# Patient Record
Sex: FEMALE | Race: WHITE | NOT HISPANIC OR LATINO | URBAN - METROPOLITAN AREA
[De-identification: names, ages, dates, MRNs, and addresses within clinical notes are randomized per-mention and may not be internally consistent; named-entity substitution may affect disease eponyms.]

---

## 2022-02-12 ENCOUNTER — EMERGENCY (EMERGENCY)
Facility: HOSPITAL | Age: 62
LOS: 1 days | Discharge: HOME | End: 2022-02-12
Attending: EMERGENCY MEDICINE
Payer: COMMERCIAL

## 2022-02-12 VITALS
TEMPERATURE: 99 F | HEIGHT: 64 IN | SYSTOLIC BLOOD PRESSURE: 118 MMHG | RESPIRATION RATE: 18 BRPM | WEIGHT: 115.08 LBS | DIASTOLIC BLOOD PRESSURE: 79 MMHG | HEART RATE: 89 BPM

## 2022-02-12 LAB
ALBUMIN SERPL ELPH-MCNC: 4.5 G/DL — SIGNIFICANT CHANGE UP (ref 3.3–5)
ALP SERPL-CCNC: 62 U/L — SIGNIFICANT CHANGE UP (ref 40–120)
ALT FLD-CCNC: 19 U/L — SIGNIFICANT CHANGE UP (ref 10–45)
ANION GAP SERPL CALC-SCNC: 15 MMOL/L — SIGNIFICANT CHANGE UP (ref 5–17)
AST SERPL-CCNC: 32 U/L — SIGNIFICANT CHANGE UP (ref 10–40)
BASOPHILS # BLD AUTO: 0.02 K/UL — SIGNIFICANT CHANGE UP (ref 0–0.2)
BASOPHILS NFR BLD AUTO: 0.4 % — SIGNIFICANT CHANGE UP (ref 0–2)
BILIRUB SERPL-MCNC: 0.3 MG/DL — SIGNIFICANT CHANGE UP (ref 0.2–1.2)
BUN SERPL-MCNC: 9 MG/DL — SIGNIFICANT CHANGE UP (ref 7–23)
CALCIUM SERPL-MCNC: 9.6 MG/DL — SIGNIFICANT CHANGE UP (ref 8.4–10.5)
CHLORIDE SERPL-SCNC: 102 MMOL/L — SIGNIFICANT CHANGE UP (ref 96–108)
CO2 SERPL-SCNC: 23 MMOL/L — SIGNIFICANT CHANGE UP (ref 22–31)
CREAT SERPL-MCNC: 0.7 MG/DL — SIGNIFICANT CHANGE UP (ref 0.5–1.3)
D DIMER BLD IA.RAPID-MCNC: <150 NG/ML DDU — SIGNIFICANT CHANGE UP
EOSINOPHIL # BLD AUTO: 0.03 K/UL — SIGNIFICANT CHANGE UP (ref 0–0.5)
EOSINOPHIL NFR BLD AUTO: 0.5 % — SIGNIFICANT CHANGE UP (ref 0–6)
FLUAV AG NPH QL: SIGNIFICANT CHANGE UP
FLUBV AG NPH QL: SIGNIFICANT CHANGE UP
GLUCOSE SERPL-MCNC: 101 MG/DL — HIGH (ref 70–99)
HCT VFR BLD CALC: 38.7 % — SIGNIFICANT CHANGE UP (ref 34.5–45)
HGB BLD-MCNC: 13.5 G/DL — SIGNIFICANT CHANGE UP (ref 11.5–15.5)
IMM GRANULOCYTES NFR BLD AUTO: 0.2 % — SIGNIFICANT CHANGE UP (ref 0–1.5)
LYMPHOCYTES # BLD AUTO: 1.85 K/UL — SIGNIFICANT CHANGE UP (ref 1–3.3)
LYMPHOCYTES # BLD AUTO: 32.6 % — SIGNIFICANT CHANGE UP (ref 13–44)
MAGNESIUM SERPL-MCNC: 1.8 MG/DL — SIGNIFICANT CHANGE UP (ref 1.6–2.6)
MCHC RBC-ENTMCNC: 31.2 PG — SIGNIFICANT CHANGE UP (ref 27–34)
MCHC RBC-ENTMCNC: 34.9 GM/DL — SIGNIFICANT CHANGE UP (ref 32–36)
MCV RBC AUTO: 89.4 FL — SIGNIFICANT CHANGE UP (ref 80–100)
MONOCYTES # BLD AUTO: 0.52 K/UL — SIGNIFICANT CHANGE UP (ref 0–0.9)
MONOCYTES NFR BLD AUTO: 9.2 % — SIGNIFICANT CHANGE UP (ref 2–14)
NEUTROPHILS # BLD AUTO: 3.25 K/UL — SIGNIFICANT CHANGE UP (ref 1.8–7.4)
NEUTROPHILS NFR BLD AUTO: 57.1 % — SIGNIFICANT CHANGE UP (ref 43–77)
NRBC # BLD: 0 /100 WBCS — SIGNIFICANT CHANGE UP (ref 0–0)
NT-PROBNP SERPL-SCNC: 132 PG/ML — SIGNIFICANT CHANGE UP (ref 0–300)
PLATELET # BLD AUTO: 279 K/UL — SIGNIFICANT CHANGE UP (ref 150–400)
POTASSIUM SERPL-MCNC: 2.9 MMOL/L — CRITICAL LOW (ref 3.5–5.3)
POTASSIUM SERPL-SCNC: 2.9 MMOL/L — CRITICAL LOW (ref 3.5–5.3)
PROT SERPL-MCNC: 7 G/DL — SIGNIFICANT CHANGE UP (ref 6–8.3)
RBC # BLD: 4.33 M/UL — SIGNIFICANT CHANGE UP (ref 3.8–5.2)
RBC # FLD: 12.1 % — SIGNIFICANT CHANGE UP (ref 10.3–14.5)
RSV RNA NPH QL NAA+NON-PROBE: SIGNIFICANT CHANGE UP
SARS-COV-2 RNA SPEC QL NAA+PROBE: SIGNIFICANT CHANGE UP
SODIUM SERPL-SCNC: 140 MMOL/L — SIGNIFICANT CHANGE UP (ref 135–145)
TROPONIN T, HIGH SENSITIVITY RESULT: 8 NG/L — SIGNIFICANT CHANGE UP (ref 0–51)
WBC # BLD: 5.68 K/UL — SIGNIFICANT CHANGE UP (ref 3.8–10.5)
WBC # FLD AUTO: 5.68 K/UL — SIGNIFICANT CHANGE UP (ref 3.8–10.5)

## 2022-02-12 PROCEDURE — 71045 X-RAY EXAM CHEST 1 VIEW: CPT | Mod: 26

## 2022-02-12 PROCEDURE — 99285 EMERGENCY DEPT VISIT HI MDM: CPT

## 2022-02-12 PROCEDURE — 93010 ELECTROCARDIOGRAM REPORT: CPT

## 2022-02-12 RX ORDER — SODIUM CHLORIDE 9 MG/ML
1000 INJECTION, SOLUTION INTRAVENOUS ONCE
Refills: 0 | Status: COMPLETED | OUTPATIENT
Start: 2022-02-12 | End: 2022-02-12

## 2022-02-12 RX ORDER — POTASSIUM CHLORIDE 20 MEQ
40 PACKET (EA) ORAL ONCE
Refills: 0 | Status: COMPLETED | OUTPATIENT
Start: 2022-02-12 | End: 2022-02-12

## 2022-02-12 RX ORDER — POTASSIUM CHLORIDE 20 MEQ
40 PACKET (EA) ORAL ONCE
Refills: 0 | Status: DISCONTINUED | OUTPATIENT
Start: 2022-02-12 | End: 2022-02-12

## 2022-02-12 RX ORDER — POTASSIUM CHLORIDE 20 MEQ
10 PACKET (EA) ORAL
Refills: 0 | Status: COMPLETED | OUTPATIENT
Start: 2022-02-12 | End: 2022-02-12

## 2022-02-12 RX ADMIN — Medication 40 MILLIEQUIVALENT(S): at 21:02

## 2022-02-12 RX ADMIN — Medication 100 MILLIEQUIVALENT(S): at 21:04

## 2022-02-12 RX ADMIN — Medication 100 MILLIEQUIVALENT(S): at 22:30

## 2022-02-12 RX ADMIN — SODIUM CHLORIDE 1000 MILLILITER(S): 9 INJECTION, SOLUTION INTRAVENOUS at 18:22

## 2022-02-12 RX ADMIN — Medication 100 MILLIEQUIVALENT(S): at 23:22

## 2022-02-12 NOTE — ED PROVIDER NOTE - NSFOLLOWUPCLINICS_GEN_ALL_ED_FT
[facility]  Cardiology  [address] [place] [id number]  Phone: [phone]  Fax:

## 2022-02-12 NOTE — ED PROVIDER NOTE - OBJECTIVE STATEMENT
62 yo F hx of basal cell carcinoma presents with palpitations past few days, Had a viral GI illness in the last week with diarrhea, fevers and began having exertional palpitations in last 3-4 days when she walks, accompanied by some shortness of breath. Endorses pain under left axilla when she raises her arms up. Went to urgent care yesterday where she had normal labs, got 1L IVF and had ekg significant for ectopic atrial focus so sent to the ER. No CP, leg swelling, abd pain, current diarrhea.

## 2022-02-12 NOTE — ED ADULT NURSE REASSESSMENT NOTE - NS ED NURSE REASSESS COMMENT FT1
Handoff report received from RN Pat. Pt resting comfortably in purple hern[address] 10. Pt A&O x 4, VSS. Pt denies palpitations, CP and SOB. Pt denies any needs at this time. Pt updated on plan of care. Bed in lowest position, side rails up and call bell in reach.

## 2022-02-12 NOTE — ED PROVIDER NOTE - NSFOLLOWUPINSTRUCTIONS_ED_ALL_ED_FT
Follow-up with your primary care physician if your symptoms persist or get worse.    I have attached followup with a cardiologist. Please call to schedule an appointment.    Palpitations    A palpitation is the feeling that your heartbeat is irregular or is faster than normal. It may feel like your heart is fluttering or skipping a beat. They may be caused by many things, including smoking, caffeine, alcohol, stress, and certain medicines. Although most causes of palpitations are not serious, palpitations can be a sign of a serious medical problem. Avoid caffeine, alcohol, and tobacco products at home. Try to reduce stress and anxiety and make sure to get plenty of rest.     SEEK IMMEDIATE MEDICAL CARE IF YOU HAVE ANY OF THE FOLLOWING SYMPTOMS: chest pain, shortness of breath, severe headache, dizziness/lightheadedness, or fainting.

## 2022-02-12 NOTE — ED PROVIDER NOTE - ATTENDING CONTRIBUTION TO CARE
RGUJRAL 60yo f hx BCC presents with palpitations x 3 days. Patient states she has had enteritis since last Monday with multiple episodes of diarrhea. Pt went to  yesterday had IVF given and felt better and tolerated a meal last night with resolving diarrhea. Denies any abdominal pain, n/v/f/c, sick contacts, recent travel. Vaccinated for COVID. Pt has noted intermittent palpitations and today noted  while doing ADLs. Pt complains of mild SOB w exertion. No chest pain.   On exam, Patient is awake, alert and oriented x 3.  Patient is well appearing and in no acute distress.  NCAT  Neck is supple  Lungs are CTA B/L,+S1S2 no murmurs,  Abdomen:Soft nd/nt+bs no rebound or guarding.  Extremity no edema or calf tender.  Skin with no rash.  Neuro CN3-12 intact. Strength 5/5 in upper and lower extremities. Nml Sensation.  EKG reviewed. Check labs, IVF and re eval.

## 2022-02-12 NOTE — ED PROVIDER NOTE - PATIENT PORTAL LINK FT
You can access the FollowMyHealth Patient Portal offered by [facility] by registering at the following website: [email or web address]. By joining [employer]’s FollowMyHealth portal, you will also be able to view your health information using other applications (apps) compatible with our system.

## 2022-02-12 NOTE — ED PROVIDER NOTE - CLINICAL SUMMARY MEDICAL DECISION MAKING FREE TEXT BOX
60 yo F being treated for basal cell carcinoma presents with palpitations for 3-4 days after a viral GI illness, focal exam negative with stable vitals. Will get ekg/trops to assess for acs, arrythmias, labs for electrolyte abnormalities in the setting of diarrhea losses. Will d-dimer to rule out PE as patient is being treated for malignancy. Will give fluids and reassess.

## 2022-02-12 NOTE — ED PROVIDER NOTE - PROGRESS NOTE DETAILS
Venkata Hopkins MD (PGY-2): Received sign-out from day-team resident. Patient reassessed before sign out - improved after 1 L IVF, no palpitations walking to bathroom.

## 2022-02-12 NOTE — ED PROVIDER NOTE - NS ED ROS FT
GENERAL: no fever, no chills, no weight lossn  CARDIAC: +palpitations, no chest pain  PULMONARY: no cough, no shortness of breath, no wheezing  GI: no abdominal pain, no nausea, no vomiting, no diarrhea, no constipation, no melena, no hematochezia, no hematemesis  : no changes in urination, no dysuria, no frequency, no hematuria, no discharge  SKIN: no rashes  NEURO: no headache, no numbness, no weakness  MSK: no joint pain, no muscle pain, no back pain, no calf pain

## 2022-02-12 NOTE — ED ADULT NURSE NOTE - OBJECTIVE STATEMENT
Pt reports having "stomach virus" with "dry Heaves" and watery diarrhea, starting earlier this week, for which she was seen yesterday @ [facility] and given IV hydration, felt better, and was discharged home.  She was able to eat something then.  Overnight she reports 1 extreme diaphoresis and intermittent fevers over past few days.  Today, she was doing household chores, felt her heart racing, checked her pulse with a pulse oximeter and found it to be in 150's.  She returned to [facility] and was directed to ED for eval of an abnormal EKG.  Oral mucosa dry, with brown discoloration.  No sternal area chest pain, but noted pain along left lateral chest when she raised her arm up.

## 2022-02-12 NOTE — ED PROVIDER NOTE - PHYSICAL EXAMINATION
GENERAL: no acute distress, non-toxic appearing  HEAD: normocephalic, atraumatic  HEENT: normal conjunctiva, oral mucosa moist, neck supple  CARDIAC: regular rate and rhythm, normal S1 and S2,  no appreciable murmurs  PULM: clear to ascultation bilaterally, no crackles, rales, rhonchi, or wheezing  GI: abdomen nondistended, soft, nontender, no guarding or rebound tenderness  NEURO: alert and oriented x 3, moving all extremities  MSK: no visible deformities, no peripheral edema, calf tenderness/redness/swelling  SKIN: no visible rashes, dry, well-perfused  PSYCH: appropriate mood and affect

## 2022-02-13 ENCOUNTER — FORM ENCOUNTER (OUTPATIENT)
Age: 62
End: 2022-02-13

## 2022-02-13 VITALS
DIASTOLIC BLOOD PRESSURE: 71 MMHG | HEART RATE: 74 BPM | RESPIRATION RATE: 16 BRPM | SYSTOLIC BLOOD PRESSURE: 115 MMHG | TEMPERATURE: 98 F | OXYGEN SATURATION: 97 %

## 2022-02-13 LAB
ANION GAP SERPL CALC-SCNC: 11 MMOL/L — SIGNIFICANT CHANGE UP (ref 5–17)
BUN SERPL-MCNC: 8 MG/DL — SIGNIFICANT CHANGE UP (ref 7–23)
CALCIUM SERPL-MCNC: 8.8 MG/DL — SIGNIFICANT CHANGE UP (ref 8.4–10.5)
CHLORIDE SERPL-SCNC: 107 MMOL/L — SIGNIFICANT CHANGE UP (ref 96–108)
CO2 SERPL-SCNC: 25 MMOL/L — SIGNIFICANT CHANGE UP (ref 22–31)
CREAT SERPL-MCNC: 0.79 MG/DL — SIGNIFICANT CHANGE UP (ref 0.5–1.3)
GLUCOSE SERPL-MCNC: 115 MG/DL — HIGH (ref 70–99)
POTASSIUM SERPL-MCNC: 3.5 MMOL/L — SIGNIFICANT CHANGE UP (ref 3.5–5.3)
POTASSIUM SERPL-SCNC: 3.5 MMOL/L — SIGNIFICANT CHANGE UP (ref 3.5–5.3)
SODIUM SERPL-SCNC: 143 MMOL/L — SIGNIFICANT CHANGE UP (ref 135–145)

## 2022-02-13 PROCEDURE — 96374 THER/PROPH/DIAG INJ IV PUSH: CPT

## 2022-02-13 PROCEDURE — 84100 ASSAY OF PHOSPHORUS: CPT

## 2022-02-13 PROCEDURE — 83735 ASSAY OF MAGNESIUM: CPT

## 2022-02-13 PROCEDURE — 87637 SARSCOV2&INF A&B&RSV AMP PRB: CPT

## 2022-02-13 PROCEDURE — 85025 COMPLETE CBC W/AUTO DIFF WBC: CPT

## 2022-02-13 PROCEDURE — 96375 TX/PRO/DX INJ NEW DRUG ADDON: CPT

## 2022-02-13 PROCEDURE — 84484 ASSAY OF TROPONIN QUANT: CPT

## 2022-02-13 PROCEDURE — 36415 COLL VENOUS BLD VENIPUNCTURE: CPT

## 2022-02-13 PROCEDURE — 83880 ASSAY OF NATRIURETIC PEPTIDE: CPT

## 2022-02-13 PROCEDURE — 71045 X-RAY EXAM CHEST 1 VIEW: CPT

## 2022-02-13 PROCEDURE — 93005 ELECTROCARDIOGRAM TRACING: CPT

## 2022-02-13 PROCEDURE — 80053 COMPREHEN METABOLIC PANEL: CPT

## 2022-02-13 PROCEDURE — 80048 BASIC METABOLIC PNL TOTAL CA: CPT

## 2022-02-13 PROCEDURE — 85379 FIBRIN DEGRADATION QUANT: CPT

## 2022-02-13 PROCEDURE — 99285 EMERGENCY DEPT VISIT HI MDM: CPT | Mod: 25

## 2022-02-14 ENCOUNTER — APPOINTMENT (OUTPATIENT)
Dept: CARDIOLOGY | Facility: CLINIC | Age: 62
End: 2022-02-14
Payer: MEDICARE

## 2022-02-14 ENCOUNTER — APPOINTMENT (OUTPATIENT)
Dept: ELECTROPHYSIOLOGY | Facility: CLINIC | Age: 62
End: 2022-02-14
Payer: COMMERCIAL

## 2022-02-14 VITALS
HEART RATE: 77 BPM | WEIGHT: 115 LBS | OXYGEN SATURATION: 97 % | HEIGHT: 64 IN | SYSTOLIC BLOOD PRESSURE: 124 MMHG | BODY MASS INDEX: 19.63 KG/M2 | DIASTOLIC BLOOD PRESSURE: 73 MMHG

## 2022-02-14 DIAGNOSIS — E87.6 HYPOKALEMIA: ICD-10-CM

## 2022-02-14 DIAGNOSIS — R11.0 NAUSEA: ICD-10-CM

## 2022-02-14 DIAGNOSIS — E86.0 DEHYDRATION: ICD-10-CM

## 2022-02-14 DIAGNOSIS — R11.10 VOMITING, UNSPECIFIED: ICD-10-CM

## 2022-02-14 DIAGNOSIS — U07.1 COVID-19: ICD-10-CM

## 2022-02-14 DIAGNOSIS — R42 DIZZINESS AND GIDDINESS: ICD-10-CM

## 2022-02-14 DIAGNOSIS — R19.7 DIARRHEA, UNSPECIFIED: ICD-10-CM

## 2022-02-14 DIAGNOSIS — Z72.89 OTHER PROBLEMS RELATED TO LIFESTYLE: ICD-10-CM

## 2022-02-14 PROBLEM — Z78.9 OTHER SPECIFIED HEALTH STATUS: Chronic | Status: ACTIVE | Noted: 2022-02-12

## 2022-02-14 PROCEDURE — 99204 OFFICE O/P NEW MOD 45 MIN: CPT

## 2022-02-14 PROCEDURE — 93000 ELECTROCARDIOGRAM COMPLETE: CPT

## 2022-02-14 RX ORDER — COLLAGEN, HYDROLYSATE (BOVINE) 100 %
POWDER (GRAM) MISCELLANEOUS
Refills: 0 | Status: ACTIVE | COMMUNITY

## 2022-02-14 NOTE — HISTORY OF PRESENT ILLNESS
[FreeTextEntry1] : Patient is a 61 year old without past medical hx who self refers for palpitations.   She had a bad GI virus last week\par ran fever briefly with severe diarrhea.  Lasted for 5 days.  Went to urgent care on the 5th day for palpitations and from there was referred to the ED for hydration.  Her potassium was 2.9 on 2/12 and then 3.5 on 2/13.\par blood work was completely normal except for the low potassium.   ECG and vitals reported normal range heart rates.\par today felt nausea and felt her heart to be racing and came for evaluation.  she has been having diarrhea until today. hasn’t taken anything to try to stop diarrhea.  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

## 2022-02-14 NOTE — CARDIOLOGY SUMMARY
[de-identified] : Sinus  Rhythm  -Short LA syndrome \par Roxanne = 96\par -Nonspecific ST depression  -Nondiagnostic. \par \par ABNORMAL \par

## 2022-02-14 NOTE — DISCUSSION/SUMMARY
[None] : There are no changes in medication management [___ Week(s)] : in [unfilled] week(s) [de-identified] : dehydration [FreeTextEntry1] : Viral syndrome with hypokalemia. dehydration, low potassium and feelings of severe palpitations\par most likely due to dehydration.  needs to rehydrate, Pedialyte\par echo and heart event monitor

## 2022-03-02 ENCOUNTER — APPOINTMENT (OUTPATIENT)
Dept: CARDIOLOGY | Facility: CLINIC | Age: 62
End: 2022-03-02
Payer: COMMERCIAL

## 2022-03-02 PROCEDURE — 93306 TTE W/DOPPLER COMPLETE: CPT

## 2022-03-07 ENCOUNTER — NON-APPOINTMENT (OUTPATIENT)
Age: 62
End: 2022-03-07

## 2022-03-07 ENCOUNTER — APPOINTMENT (OUTPATIENT)
Dept: CARDIOLOGY | Facility: CLINIC | Age: 62
End: 2022-03-07
Payer: COMMERCIAL

## 2022-03-07 VITALS — SYSTOLIC BLOOD PRESSURE: 115 MMHG | OXYGEN SATURATION: 98 % | HEART RATE: 65 BPM | DIASTOLIC BLOOD PRESSURE: 73 MMHG

## 2022-03-07 VITALS — HEIGHT: 64 IN | WEIGHT: 119 LBS | BODY MASS INDEX: 20.32 KG/M2

## 2022-03-07 PROCEDURE — 93000 ELECTROCARDIOGRAM COMPLETE: CPT

## 2022-03-07 PROCEDURE — 99214 OFFICE O/P EST MOD 30 MIN: CPT

## 2022-03-07 NOTE — HISTORY OF PRESENT ILLNESS
[FreeTextEntry1] : Patient is a 61 year old without past medical hx who self refers for palpitations.   She had a bad GI virus last week\par ran fever briefly with severe diarrhea.  Lasted for 5 days.  Went to urgent care on the 5th day for palpitations and from there was referred to the ED for hydration.  Her potassium was 2.9 on 2/12 and then 3.5 on 2/13.\par blood work was completely normal except for the low potassium.   ECG and vitals reported normal range heart rates.\par today felt nausea and felt her heart to be racing and came for evaluation.  she has been having diarrhea until today. hasn’t taken anything to try to stop diarrhea.  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  all GI sx are resolved.  \par echo; structurally normal heart with mild mr/tr\par event recorder: results not back yet\par

## 2022-03-07 NOTE — DISCUSSION/SUMMARY
[Anxiety] : anxiety disorder NOS [Paroxysmal SVT] : paroxysmal supraventricular tachycardia [None] : There are no changes in medication management [___ Month(s)] : in [unfilled] month(s) [de-identified] : dehydration [FreeTextEntry1] : work up shows normal heart structurally.  GI sx have resolved. event recorder with brief self terminating SVT events which correlated wit pts sx most likely triiggered by GI sx.\par sx have now resolved.  \par would ask pt to cont to monitor for recurrence.\par get blood work from PCP.\par f/u 3 months

## 2022-03-08 PROCEDURE — 93248 EXT ECG>7D<15D REV&INTERPJ: CPT

## 2022-03-23 ENCOUNTER — APPOINTMENT (OUTPATIENT)
Dept: GASTROENTEROLOGY | Facility: CLINIC | Age: 62
End: 2022-03-23
Payer: COMMERCIAL

## 2022-03-23 VITALS
OXYGEN SATURATION: 98 % | HEART RATE: 71 BPM | BODY MASS INDEX: 20.57 KG/M2 | WEIGHT: 120.5 LBS | SYSTOLIC BLOOD PRESSURE: 110 MMHG | TEMPERATURE: 96.9 F | DIASTOLIC BLOOD PRESSURE: 72 MMHG | HEIGHT: 64 IN

## 2022-03-23 DIAGNOSIS — Z11.52 ENCOUNTER FOR SCREENING FOR COVID-19: ICD-10-CM

## 2022-03-23 DIAGNOSIS — R19.5 OTHER FECAL ABNORMALITIES: ICD-10-CM

## 2022-03-23 DIAGNOSIS — Z01.818 ENCOUNTER FOR OTHER PREPROCEDURAL EXAMINATION: ICD-10-CM

## 2022-03-23 DIAGNOSIS — Z00.00 ENCOUNTER FOR GENERAL ADULT MEDICAL EXAMINATION W/OUT ABNORMAL FINDINGS: ICD-10-CM

## 2022-03-23 PROCEDURE — 99203 OFFICE O/P NEW LOW 30 MIN: CPT

## 2022-03-23 NOTE — ASSESSMENT
[FreeTextEntry1] : Impression and plan\par The patient came to the office today to discuss recent positive colguard  she has a previous history of adenomatous polyps. I will recommend upper endoscopy and colonoscopy to be performed in tandem. The risks benefits alternatives and limitations of procedure were discussed. We'll make further suggestions after above testing is completed and advise.

## 2022-03-23 NOTE — HISTORY OF PRESENT ILLNESS
[FreeTextEntry1] : Patient presents to the office today for evaluation of recent Cologuard testing. Patient went for a physical with her primary care physician and was noted to have a positive cologuard test.  Patient's past medical history was reviewed and includes a recent bout of gastroenteritis about 6 weeks ago which has since resolved. Patient has had previous colonoscopy about 10 years ago demonstrating sessile serrated adenoma of the cecum. The patient denies current nausea vomiting fever chills rectal bleeding or melena.

## 2022-07-11 ENCOUNTER — NON-APPOINTMENT (OUTPATIENT)
Age: 62
End: 2022-07-11

## 2022-07-11 ENCOUNTER — APPOINTMENT (OUTPATIENT)
Dept: CARDIOLOGY | Facility: CLINIC | Age: 62
End: 2022-07-11

## 2022-07-11 VITALS
OXYGEN SATURATION: 98 % | BODY MASS INDEX: 20.49 KG/M2 | SYSTOLIC BLOOD PRESSURE: 137 MMHG | WEIGHT: 120 LBS | DIASTOLIC BLOOD PRESSURE: 78 MMHG | HEART RATE: 71 BPM | HEIGHT: 64 IN

## 2022-07-11 PROCEDURE — 99214 OFFICE O/P EST MOD 30 MIN: CPT

## 2022-07-11 PROCEDURE — 93000 ELECTROCARDIOGRAM COMPLETE: CPT

## 2022-07-11 NOTE — DISCUSSION/SUMMARY
[Paroxysmal Atrial Tachycardia] : paroxysmal atrial tachycardia [Stable] : stable [___ Month(s)] : in [unfilled] month(s) [FreeTextEntry1] : work up shows normal heart structurally.  GI sx have resolved. event recorder with brief self terminating SVT events which correlated wit pts sx most likely triiggered by GI sx.\par sx have now resolved.  \par would ask pt to cont to monitor for recurrence.\par get blood work from PCP.\par f/u 3 months\par \par 7/11/2022  brief runs of self terminating SVT which are improving overall.  discussed trial of beta blocker vs observation.  sx are improving overall.  \par recheck cholesterol, TFTS and metab panel

## 2022-07-11 NOTE — HISTORY OF PRESENT ILLNESS
[FreeTextEntry1] : Patient is a 61 year old without past medical hx who self refers for palpitations.   She had a bad GI virus last week\par ran fever briefly with severe diarrhea.  Lasted for 5 days.  Went to urgent care on the 5th day for palpitations and from there was referred to the ED for hydration.  Her potassium was 2.9 on 2/12 and then 3.5 on 2/13.\par blood work was completely normal except for the low potassium.   ECG and vitals reported normal range heart rates.\par today felt nausea and felt her heart to be racing and came for evaluation.  she has been having diarrhea until today. hasn’t taken anything to try to stop diarrhea.  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  all GI sx are resolved.  \par echo; structurally normal heart with mild mr/tr\par event recorder: results not back yet\par \par \par 7/11/2022  overall feels better.  structurally normal heart.  brief runs of SVT all self terminate and appear to be fewer in event over the last month.

## 2022-07-15 ENCOUNTER — LABORATORY RESULT (OUTPATIENT)
Age: 62
End: 2022-07-15

## 2022-07-20 ENCOUNTER — APPOINTMENT (OUTPATIENT)
Dept: GASTROENTEROLOGY | Facility: AMBULATORY MEDICAL SERVICES | Age: 62
End: 2022-07-20

## 2022-07-20 PROCEDURE — 45380 COLONOSCOPY AND BIOPSY: CPT

## 2022-07-20 PROCEDURE — 43235 EGD DIAGNOSTIC BRUSH WASH: CPT | Mod: 59

## 2022-08-10 ENCOUNTER — NON-APPOINTMENT (OUTPATIENT)
Age: 62
End: 2022-08-10

## 2022-08-10 RX ORDER — BISMUTH SUBSALICYLATE 262 MG
262 TABLET,CHEWABLE ORAL 4 TIMES DAILY
Qty: 120 | Refills: 0 | Status: ACTIVE | COMMUNITY
Start: 2022-08-10 | End: 1900-01-01

## 2022-09-19 LAB
ALBUMIN SERPL ELPH-MCNC: 4.5 G/DL
ALP BLD-CCNC: 80 U/L
ALT SERPL-CCNC: 21 U/L
ANION GAP SERPL CALC-SCNC: 10 MMOL/L
AST SERPL-CCNC: 21 U/L
BILIRUB SERPL-MCNC: 0.4 MG/DL
BUN SERPL-MCNC: 20 MG/DL
CALCIUM SERPL-MCNC: 9.6 MG/DL
CHLORIDE SERPL-SCNC: 104 MMOL/L
CO2 SERPL-SCNC: 28 MMOL/L
CREAT SERPL-MCNC: 0.8 MG/DL
EGFR: 84 ML/MIN/1.73M2
GLUCOSE SERPL-MCNC: 94 MG/DL
POTASSIUM SERPL-SCNC: 4.4 MMOL/L
PROT SERPL-MCNC: 6.8 G/DL
SODIUM SERPL-SCNC: 142 MMOL/L

## 2022-09-20 LAB
CHOLEST SERPL-MCNC: 258 MG/DL
HDLC SERPL-MCNC: 68 MG/DL
LDLC SERPL CALC-MCNC: 173 MG/DL
NONHDLC SERPL-MCNC: 190 MG/DL
T4 SERPL-MCNC: 6.9 UG/DL
TRIGL SERPL-MCNC: 88 MG/DL
TSH SERPL-ACNC: 0.78 UIU/ML
UREA BREATH TEST QL: NEGATIVE

## 2022-09-28 ENCOUNTER — APPOINTMENT (OUTPATIENT)
Dept: GASTROENTEROLOGY | Facility: CLINIC | Age: 62
End: 2022-09-28

## 2022-09-28 VITALS
HEIGHT: 64 IN | WEIGHT: 117 LBS | HEART RATE: 68 BPM | SYSTOLIC BLOOD PRESSURE: 120 MMHG | BODY MASS INDEX: 19.97 KG/M2 | OXYGEN SATURATION: 99 % | DIASTOLIC BLOOD PRESSURE: 68 MMHG | TEMPERATURE: 97.7 F

## 2022-09-28 DIAGNOSIS — A04.8 OTHER SPECIFIED BACTERIAL INTESTINAL INFECTIONS: ICD-10-CM

## 2022-09-28 PROCEDURE — 99213 OFFICE O/P EST LOW 20 MIN: CPT

## 2022-09-28 NOTE — ASSESSMENT
[FreeTextEntry1] : Impression and plan\par \par Patient came to the office today for follow-up and discussion original presentation was for positive Cologuard test.  Subsequent colonoscopy was performed demonstrating a small benign polyp in the rectum, hyperplasia.  Patient also noted to have erythematous mucosa possibly related to preparation.  Biopsies were unrevealing.  Upper endoscopy showed H. pylori infection and subsequent H. pylori breath testing was negative.  Patient feels well and offers no complaints at this time.  I have asked the patient to come back and see me in about a year for routine follow-up sooner if necessary for any symptoms.  I suggested repeat Cologuard test in approximately 6 months.  Patient deferred secondary to cost.

## 2022-09-28 NOTE — HISTORY OF PRESENT ILLNESS
[FreeTextEntry1] : Patient returns to the office today for follow-up and discussion after recent endoscopy and colonoscopy.  She was found to have H. pylori infection and was treated with amoxicillin based regimen.  Subsequent H. pylori breath testing was negative.  Patient feels well and offers no complaints.

## 2022-10-14 ENCOUNTER — APPOINTMENT (OUTPATIENT)
Dept: CARDIOLOGY | Facility: CLINIC | Age: 62
End: 2022-10-14

## 2022-10-14 ENCOUNTER — NON-APPOINTMENT (OUTPATIENT)
Age: 62
End: 2022-10-14

## 2022-10-14 VITALS
WEIGHT: 117 LBS | DIASTOLIC BLOOD PRESSURE: 74 MMHG | HEART RATE: 64 BPM | OXYGEN SATURATION: 98 % | BODY MASS INDEX: 20.47 KG/M2 | HEIGHT: 63.5 IN | SYSTOLIC BLOOD PRESSURE: 122 MMHG

## 2022-10-14 PROCEDURE — 93000 ELECTROCARDIOGRAM COMPLETE: CPT

## 2022-10-14 PROCEDURE — 99214 OFFICE O/P EST MOD 30 MIN: CPT | Mod: 25

## 2022-10-14 NOTE — DISCUSSION/SUMMARY
[Hyperlipidemia] : hyperlipidemia [Diet Modification] : diet modification [Paroxysmal A-V Tachycardia] : paroxysmal A-V tachycardia [Improving] : improving [___ Month(s)] : in [unfilled] month(s) [FreeTextEntry1] : work up shows normal heart structurally.  GI sx have resolved. event recorder with brief self terminating SVT events which correlated wit pts sx most likely triiggered by GI sx.\par sx have now resolved.  \par would ask pt to cont to monitor for recurrence.\par get blood work from PCP.\par f/u 3 months\par \par 10/14/2022 BP is good, palps are much better, cholesterol has gone up. and diet needs modification.  nutrition referral.  repeat blood work in 2 months. screening carotid doppler.

## 2022-10-14 NOTE — HISTORY OF PRESENT ILLNESS
[FreeTextEntry1] : Patient is a 61 year old without past medical hx who self refers for palpitations.   She had a bad GI virus last week\par ran fever briefly with severe diarrhea.  Lasted for 5 days.  Went to urgent care on the 5th day for palpitations and from there was referred to the ED for hydration.  Her potassium was 2.9 on 2/12 and then 3.5 on 2/13.\par blood work was completely normal except for the low potassium.   ECG and vitals reported normal range heart rates.\par today felt nausea and felt her heart to be racing and came for evaluation.  she has been having diarrhea until today. hasn’t taken anything to try to stop diarrhea.  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  all GI sx are resolved.  \par echo; structurally normal heart with mild mr/tr\par event recorder: results not back yet\par \par \par 7/11/2022  overall feels better.  structurally normal heart.  brief runs of SVT all self terminate and appear to be fewer in event over the last month.  \par \par 10/14/2022  discussed significant increase in cholesterol.  pt states she eats fried chicken twice per week.  has never seen nutritonist.  will make referral

## 2022-10-26 ENCOUNTER — APPOINTMENT (OUTPATIENT)
Dept: ULTRASOUND IMAGING | Facility: CLINIC | Age: 62
End: 2022-10-26

## 2022-10-26 PROCEDURE — 93880 EXTRACRANIAL BILAT STUDY: CPT

## 2022-12-29 LAB
CHOLEST SERPL-MCNC: 249 MG/DL
HDLC SERPL-MCNC: 69 MG/DL
LDLC SERPL CALC-MCNC: 147 MG/DL
NONHDLC SERPL-MCNC: 180 MG/DL
TRIGL SERPL-MCNC: 163 MG/DL

## 2023-01-20 ENCOUNTER — APPOINTMENT (OUTPATIENT)
Dept: CARDIOLOGY | Facility: CLINIC | Age: 63
End: 2023-01-20
Payer: SELF-PAY

## 2023-01-20 ENCOUNTER — NON-APPOINTMENT (OUTPATIENT)
Age: 63
End: 2023-01-20

## 2023-01-20 VITALS
HEIGHT: 63.5 IN | DIASTOLIC BLOOD PRESSURE: 76 MMHG | WEIGHT: 121 LBS | BODY MASS INDEX: 21.17 KG/M2 | OXYGEN SATURATION: 98 % | SYSTOLIC BLOOD PRESSURE: 122 MMHG | HEART RATE: 67 BPM

## 2023-01-20 PROCEDURE — 99214 OFFICE O/P EST MOD 30 MIN: CPT

## 2023-01-20 PROCEDURE — 93000 ELECTROCARDIOGRAM COMPLETE: CPT

## 2023-01-20 RX ORDER — BISMUTH SUBCITRATE POTASSIUM, METRONIDAZOLE, AND TETRACYCLINE HYDROCHLORIDE 140; 125; 125 MG/1; MG/1; MG/1
140-125-125 CAPSULE ORAL 3 TIMES DAILY
Qty: 120 | Refills: 0 | Status: DISCONTINUED | COMMUNITY
Start: 2022-08-10 | End: 2023-01-20

## 2023-01-20 RX ORDER — PANTOPRAZOLE 20 MG/1
20 TABLET, DELAYED RELEASE ORAL TWICE DAILY
Qty: 180 | Refills: 3 | Status: DISCONTINUED | COMMUNITY
Start: 2022-08-10 | End: 2023-01-20

## 2023-01-20 RX ORDER — OMEPRAZOLE 20 MG/1
20 CAPSULE, DELAYED RELEASE ORAL TWICE DAILY
Qty: 60 | Refills: 3 | Status: DISCONTINUED | COMMUNITY
Start: 2022-08-10 | End: 2023-01-20

## 2023-01-20 RX ORDER — CLARITHROMYCIN 500 MG/1
500 TABLET, FILM COATED ORAL TWICE DAILY
Qty: 28 | Refills: 0 | Status: DISCONTINUED | COMMUNITY
Start: 2022-08-10 | End: 2023-01-20

## 2023-01-20 RX ORDER — SODIUM PICOSULFATE, MAGNESIUM OXIDE, AND ANHYDROUS CITRIC ACID 10; 3.5; 12 MG/160ML; G/160ML; G/160ML
10-3.5-12 MG-GM LIQUID ORAL
Qty: 1 | Refills: 0 | Status: DISCONTINUED | COMMUNITY
Start: 2022-03-23 | End: 2023-01-20

## 2023-01-20 RX ORDER — AMOXICILLIN 500 MG/1
500 CAPSULE ORAL TWICE DAILY
Qty: 56 | Refills: 0 | Status: DISCONTINUED | COMMUNITY
Start: 2022-08-10 | End: 2023-01-20

## 2023-01-20 NOTE — HISTORY OF PRESENT ILLNESS
[FreeTextEntry1] : Patient is a 61 year old without past medical hx who self refers for palpitations.   She had a bad GI virus last week\par ran fever briefly with severe diarrhea.  Lasted for 5 days.  Went to urgent care on the 5th day for palpitations and from there was referred to the ED for hydration.  Her potassium was 2.9 on 2/12 and then 3.5 on 2/13.\par blood work was completely normal except for the low potassium.   ECG and vitals reported normal range heart rates.\par today felt nausea and felt her heart to be racing and came for evaluation.  she has been having diarrhea until today. hasn’t taken anything to try to stop diarrhea.  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  all GI sx are resolved.  \par echo; structurally normal heart with mild mr/tr\par event recorder: results not back yet\par \par \par 7/11/2022  overall feels better.  structurally normal heart.  brief runs of SVT all self terminate and appear to be fewer in event over the last month.  \par \par 10/14/2022  discussed significant increase in cholesterol.  pt states she eats fried chicken twice per week.  has never seen nutritonist.  will make referral\par \par 1/20/2023  reviewed lipid panel.

## 2023-01-20 NOTE — DISCUSSION/SUMMARY
[Hyperlipidemia] : hyperlipidemia [Medication Changes Per Orders] : Medication changes are as documented in orders [Diet Modification] : diet modification [___ Month(s)] : in [unfilled] month(s) [de-identified] : [place] [FreeTextEntry1] : work up shows normal heart structurally.  GI sx have resolved. event recorder with brief self terminating SVT events which correlated wit pts sx most likely triiggered by GI sx.\par sx have now resolved.  \par would ask pt to cont to monitor for recurrence.\par get blood work from PCP.\par f/u 3 months\par  \par 1/202/2023  agreed to start low dose statin.  will follow up in two months and repeat lipid panel.

## 2023-03-20 LAB
ALBUMIN SERPL ELPH-MCNC: 4.6 G/DL
ALP BLD-CCNC: 67 U/L
ALT SERPL-CCNC: 18 U/L
ANION GAP SERPL CALC-SCNC: 11 MMOL/L
AST SERPL-CCNC: 22 U/L
BILIRUB SERPL-MCNC: 0.4 MG/DL
BUN SERPL-MCNC: 16 MG/DL
CALCIUM SERPL-MCNC: 9.7 MG/DL
CHLORIDE SERPL-SCNC: 103 MMOL/L
CHOLEST SERPL-MCNC: 187 MG/DL
CO2 SERPL-SCNC: 27 MMOL/L
CREAT SERPL-MCNC: 0.83 MG/DL
EGFR: 80 ML/MIN/1.73M2
GLUCOSE SERPL-MCNC: 104 MG/DL
HDLC SERPL-MCNC: 63 MG/DL
LDLC SERPL CALC-MCNC: 99 MG/DL
NONHDLC SERPL-MCNC: 124 MG/DL
POTASSIUM SERPL-SCNC: 4.1 MMOL/L
PROT SERPL-MCNC: 6.8 G/DL
SODIUM SERPL-SCNC: 141 MMOL/L
TRIGL SERPL-MCNC: 125 MG/DL

## 2023-03-24 ENCOUNTER — APPOINTMENT (OUTPATIENT)
Dept: CARDIOLOGY | Facility: CLINIC | Age: 63
End: 2023-03-24
Payer: COMMERCIAL

## 2023-03-24 VITALS
OXYGEN SATURATION: 96 % | BODY MASS INDEX: 21.17 KG/M2 | DIASTOLIC BLOOD PRESSURE: 70 MMHG | HEART RATE: 62 BPM | WEIGHT: 121 LBS | SYSTOLIC BLOOD PRESSURE: 107 MMHG | HEIGHT: 63.5 IN

## 2023-03-24 PROCEDURE — 99214 OFFICE O/P EST MOD 30 MIN: CPT | Mod: 25

## 2023-03-24 PROCEDURE — 93000 ELECTROCARDIOGRAM COMPLETE: CPT

## 2023-03-24 NOTE — DISCUSSION/SUMMARY
[Hyperlipidemia] : hyperlipidemia [Improving] : improving [Responding to Treatment] : responding to treatment [None] : There are no changes in medication management [Anxiety] : anxiety disorder NOS [Paroxysmal A-V Tachycardia] : paroxysmal A-V tachycardia [___ Month(s)] : in [unfilled] month(s) [FreeTextEntry1] : work up shows normal heart structurally.  GI sx have resolved. event recorder with brief self terminating SVT events which correlated wit pts sx most likely triiggered by GI sx.\par sx have now resolved.  \par would ask pt to cont to monitor for recurrence.\par get blood work from PCP.\par f/u 3 months\par \par 3/24/2023  GI sx are better.  cholesterol is improved. maintain statin.  palps have greatly lessened.  maintain statin.

## 2023-03-24 NOTE — HISTORY OF PRESENT ILLNESS
[FreeTextEntry1] : Patient is a 61 year old without past medical hx who self refers for palpitations.   She had a bad GI virus last week\par ran fever briefly with severe diarrhea.  Lasted for 5 days.  Went to urgent care on the 5th day for palpitations and from there was referred to the ED for hydration.  Her potassium was 2.9 on 2/12 and then 3.5 on 2/13.\par blood work was completely normal except for the low potassium.   ECG and vitals reported normal range heart rates.\par today felt nausea and felt her heart to be racing and came for evaluation.  she has been having diarrhea until today. hasn’t taken anything to try to stop diarrhea.  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  all GI sx are resolved.  \par echo; structurally normal heart with mild mr/tr\par event recorder: results not back yet\par \par \par 7/11/2022  overall feels better.  structurally normal heart.  brief runs of SVT all self terminate and appear to be fewer in event over the last month.  \par \par 10/14/2022  discussed significant increase in cholesterol.  pt states she eats fried chicken twice per week.  has never seen nutritonist.  will make referral\par \par 1/20/2023  reviewed lipid panel.  \par \par 3/24/2023  no new sx.  tolerating statin well with decrease in cholesterol levels.

## 2023-07-21 LAB
ALBUMIN SERPL ELPH-MCNC: 4.7 G/DL
ALP BLD-CCNC: 65 U/L
ALT SERPL-CCNC: 15 U/L
ANION GAP SERPL CALC-SCNC: 13 MMOL/L
AST SERPL-CCNC: 21 U/L
BILIRUB SERPL-MCNC: 0.5 MG/DL
BUN SERPL-MCNC: 18 MG/DL
CALCIUM SERPL-MCNC: 9.4 MG/DL
CHLORIDE SERPL-SCNC: 105 MMOL/L
CHOLEST SERPL-MCNC: 176 MG/DL
CO2 SERPL-SCNC: 24 MMOL/L
CREAT SERPL-MCNC: 0.77 MG/DL
EGFR: 87 ML/MIN/1.73M2
GLUCOSE SERPL-MCNC: 97 MG/DL
HDLC SERPL-MCNC: 70 MG/DL
LDLC SERPL CALC-MCNC: 89 MG/DL
NONHDLC SERPL-MCNC: 106 MG/DL
POTASSIUM SERPL-SCNC: 4.1 MMOL/L
PROT SERPL-MCNC: 6.9 G/DL
SODIUM SERPL-SCNC: 142 MMOL/L
TRIGL SERPL-MCNC: 97 MG/DL

## 2023-07-24 ENCOUNTER — APPOINTMENT (OUTPATIENT)
Dept: CARDIOLOGY | Facility: CLINIC | Age: 63
End: 2023-07-24
Payer: COMMERCIAL

## 2023-07-24 ENCOUNTER — NON-APPOINTMENT (OUTPATIENT)
Age: 63
End: 2023-07-24

## 2023-07-24 VITALS
SYSTOLIC BLOOD PRESSURE: 115 MMHG | HEART RATE: 63 BPM | BODY MASS INDEX: 20.38 KG/M2 | WEIGHT: 115 LBS | OXYGEN SATURATION: 98 % | HEIGHT: 63 IN | DIASTOLIC BLOOD PRESSURE: 74 MMHG

## 2023-07-24 VITALS
OXYGEN SATURATION: 98 % | DIASTOLIC BLOOD PRESSURE: 74 MMHG | WEIGHT: 115 LBS | SYSTOLIC BLOOD PRESSURE: 115 MMHG | HEIGHT: 63.5 IN | BODY MASS INDEX: 20.12 KG/M2 | HEART RATE: 63 BPM

## 2023-07-24 LAB
ESTIMATED AVERAGE GLUCOSE: 117 MG/DL
HBA1C MFR BLD HPLC: 5.7 %

## 2023-07-24 PROCEDURE — 99213 OFFICE O/P EST LOW 20 MIN: CPT | Mod: 25

## 2023-07-24 PROCEDURE — 93000 ELECTROCARDIOGRAM COMPLETE: CPT

## 2023-07-24 NOTE — HISTORY OF PRESENT ILLNESS
[FreeTextEntry1] : Patient is a 61 year old without past medical hx who self refers for palpitations.   She had a bad GI virus last week\par ran fever briefly with severe diarrhea.  Lasted for 5 days.  Went to urgent care on the 5th day for palpitations and from there was referred to the ED for hydration.  Her potassium was 2.9 on 2/12 and then 3.5 on 2/13.\par blood work was completely normal except for the low potassium.   ECG and vitals reported normal range heart rates.\par today felt nausea and felt her heart to be racing and came for evaluation.  she has been having diarrhea until today. hasn’t taken anything to try to stop diarrhea.  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  all GI sx are resolved.  \par echo; structurally normal heart with mild mr/tr\par event recorder: results not back yet\par \par \par 7/11/2022  overall feels better.  structurally normal heart.  brief runs of SVT all self terminate and appear to be fewer in event over the last month.  \par \par 10/14/2022  discussed significant increase in cholesterol.  pt states she eats fried chicken twice per week.  has never seen nutritonist.  will make referral\par \par 1/20/2023  reviewed lipid panel.  \par \par 3/24/2023  no new sx.  tolerating statin well with decrease in cholesterol levels.\par \par 7/24/2023  no new sx.. palps are better.  reviewed blood work with good reduction in cholesterol.  no other sx,

## 2023-07-24 NOTE — DISCUSSION/SUMMARY
[Hyperlipidemia] : hyperlipidemia [Improving] : improving [Responding to Treatment] : responding to treatment [Paroxysmal SVT] : paroxysmal supraventricular tachycardia [Stable] : stable [None] : There are no changes in medication management [___ Month(s)] : in [unfilled] month(s) [FreeTextEntry1] : work up shows normal heart structurally.  GI sx have resolved. event recorder with brief self terminating SVT events which correlated wit pts sx most likely triiggered by GI sx.\par sx have now resolved.  \par would ask pt to cont to monitor for recurrence.\par get blood work from PCP.\par f/u 3 months\par \par 7/24/2023  blood work shows progressive improvement.   no med intolerance .. BP well controlled..maintain statin. [EKG obtained to assist in diagnosis and management of assessed problem(s)] : EKG obtained to assist in diagnosis and management of assessed problem(s)

## 2024-01-26 ENCOUNTER — APPOINTMENT (OUTPATIENT)
Dept: CARDIOLOGY | Facility: CLINIC | Age: 64
End: 2024-01-26
Payer: COMMERCIAL

## 2024-01-26 VITALS
SYSTOLIC BLOOD PRESSURE: 126 MMHG | BODY MASS INDEX: 19.84 KG/M2 | DIASTOLIC BLOOD PRESSURE: 72 MMHG | WEIGHT: 112 LBS | HEIGHT: 63 IN | HEART RATE: 64 BPM | OXYGEN SATURATION: 98 %

## 2024-01-26 DIAGNOSIS — I47.10 SUPRAVENTRICULAR TACHYCARDIA, UNSPECIFIED: ICD-10-CM

## 2024-01-26 DIAGNOSIS — R00.0 TACHYCARDIA, UNSPECIFIED: ICD-10-CM

## 2024-01-26 DIAGNOSIS — R00.2 PALPITATIONS: ICD-10-CM

## 2024-01-26 DIAGNOSIS — E78.00 PURE HYPERCHOLESTEROLEMIA, UNSPECIFIED: ICD-10-CM

## 2024-01-26 PROCEDURE — 93000 ELECTROCARDIOGRAM COMPLETE: CPT

## 2024-01-26 PROCEDURE — 99213 OFFICE O/P EST LOW 20 MIN: CPT | Mod: 25

## 2024-01-26 RX ORDER — ATORVASTATIN CALCIUM 10 MG/1
10 TABLET, FILM COATED ORAL DAILY
Qty: 90 | Refills: 3 | Status: ACTIVE | COMMUNITY
Start: 2023-01-20 | End: 1900-01-01

## 2024-01-26 NOTE — DISCUSSION/SUMMARY
[Hyperlipidemia] : hyperlipidemia [Paroxysmal SVT] : paroxysmal supraventricular tachycardia [Stable] : stable [None] : There are no changes in medication management [___ Month(s)] : in [unfilled] month(s) [FreeTextEntry1] : work up shows normal heart structurally.  GI sx have resolved. event recorder with brief self terminating SVT events which correlated wit pts sx most likely triiggered by GI sx. sx have now resolved.   would ask pt to cont to monitor for recurrence. get blood work from PCP. f/u 3 months  7/24/2023  blood work shows progressive improvement.   no med intolerance .. BP well controlled..maintain statin.  1/26/2024  palps less notable.  no blood work. wiill order.. BP very good no med changes., [EKG obtained to assist in diagnosis and management of assessed problem(s)] : EKG obtained to assist in diagnosis and management of assessed problem(s)

## 2024-01-26 NOTE — HISTORY OF PRESENT ILLNESS
[FreeTextEntry1] : Patient is a 61 year old without past medical hx who self refers for palpitations.   She had a bad GI virus last week ran fever briefly with severe diarrhea.  Lasted for 5 days.  Went to urgent care on the 5th day for palpitations and from there was referred to the ED for hydration.  Her potassium was 2.9 on 2/12 and then 3.5 on 2/13. blood work was completely normal except for the low potassium.   ECG and vitals reported normal range heart rates. today felt nausea and felt her heart to be racing and came for evaluation.  she has been having diarrhea until today. hasn't taken anything to try to stop diarrhea.  lost about 6 lbs.  denies chest pain or shortness of breath, had pulling feeling under the left armpit.  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  all GI sx are resolved.   echo; structurally normal heart with mild mr/tr event recorder: results not back yet   7/11/2022  overall feels better.  structurally normal heart.  brief runs of SVT all self terminate and appear to be fewer in event over the last month.    10/14/2022  discussed significant increase in cholesterol.  pt states she eats fried chicken twice per week.  has never seen nutritonist.  will make referral  1/20/2023  reviewed lipid panel.    3/24/2023  no new sx.  tolerating statin well with decrease in cholesterol levels.  7/24/2023  no new sx.. palps are better.  reviewed blood work with good reduction in cholesterol.  no other sx,  1/26/2024  palps are better.. seeem to be less sustained.,  no  cp, dizziness., syncope,  no  blood work

## 2024-01-26 NOTE — PHYSICAL EXAM
[Well Developed] : well developed [Well Nourished] : well nourished [No Acute Distress] : no acute distress [Normal Conjunctiva] : normal conjunctiva [Normal Venous Pressure] : normal venous pressure [No Carotid Bruit] : no carotid bruit [Normal S1, S2] : normal S1, S2 [No Murmur] : no murmur [No Rub] : no rub [No Gallop] : no gallop [Clear Lung Fields] : clear lung fields [Good Air Entry] : good air entry [No Respiratory Distress] : no respiratory distress  [Soft] : abdomen soft [Non Tender] : non-tender [No Masses/organomegaly] : no masses/organomegaly [Normal Bowel Sounds] : normal bowel sounds [Normal Gait] : normal gait [No Cyanosis] : no cyanosis [No Edema] : no edema [No Clubbing] : no clubbing [No Varicosities] : no varicosities [No Rash] : no rash [No Skin Lesions] : no skin lesions [No Focal Deficits] : no focal deficits [Moves all extremities] : moves all extremities [Normal Speech] : normal speech [Normal memory] : normal memory [Alert and Oriented] : alert and oriented

## 2024-01-30 LAB
ALBUMIN SERPL ELPH-MCNC: 4.7 G/DL
ALP BLD-CCNC: 68 U/L
ALT SERPL-CCNC: 16 U/L
ANION GAP SERPL CALC-SCNC: 12 MMOL/L
AST SERPL-CCNC: 21 U/L
BILIRUB SERPL-MCNC: 0.4 MG/DL
BUN SERPL-MCNC: 17 MG/DL
CALCIUM SERPL-MCNC: 9.9 MG/DL
CHLORIDE SERPL-SCNC: 103 MMOL/L
CHOLEST SERPL-MCNC: 187 MG/DL
CO2 SERPL-SCNC: 28 MMOL/L
CREAT SERPL-MCNC: 0.74 MG/DL
EGFR: 91 ML/MIN/1.73M2
ESTIMATED AVERAGE GLUCOSE: 117 MG/DL
GLUCOSE SERPL-MCNC: 101 MG/DL
HBA1C MFR BLD HPLC: 5.7 %
HDLC SERPL-MCNC: 72 MG/DL
LDLC SERPL CALC-MCNC: 101 MG/DL
NONHDLC SERPL-MCNC: 115 MG/DL
POTASSIUM SERPL-SCNC: 4 MMOL/L
PROT SERPL-MCNC: 7 G/DL
SODIUM SERPL-SCNC: 143 MMOL/L
TRIGL SERPL-MCNC: 78 MG/DL

## 2024-08-02 ENCOUNTER — APPOINTMENT (OUTPATIENT)
Dept: CARDIOLOGY | Facility: CLINIC | Age: 64
End: 2024-08-02
Payer: COMMERCIAL

## 2024-08-02 VITALS
OXYGEN SATURATION: 99 % | HEIGHT: 63 IN | HEART RATE: 62 BPM | SYSTOLIC BLOOD PRESSURE: 107 MMHG | BODY MASS INDEX: 20.2 KG/M2 | WEIGHT: 114 LBS | DIASTOLIC BLOOD PRESSURE: 70 MMHG

## 2024-08-02 DIAGNOSIS — R00.2 PALPITATIONS: ICD-10-CM

## 2024-08-02 DIAGNOSIS — E78.00 PURE HYPERCHOLESTEROLEMIA, UNSPECIFIED: ICD-10-CM

## 2024-08-02 DIAGNOSIS — G47.33 OBSTRUCTIVE SLEEP APNEA (ADULT) (PEDIATRIC): ICD-10-CM

## 2024-08-02 LAB
ALBUMIN SERPL ELPH-MCNC: 4.5 G/DL
ALP BLD-CCNC: 72 U/L
ALT SERPL-CCNC: 27 U/L
ANION GAP SERPL CALC-SCNC: 11 MMOL/L
AST SERPL-CCNC: 27 U/L
BILIRUB SERPL-MCNC: 0.5 MG/DL
BUN SERPL-MCNC: 20 MG/DL
CALCIUM SERPL-MCNC: 9.6 MG/DL
CHLORIDE SERPL-SCNC: 104 MMOL/L
CHOLEST SERPL-MCNC: 185 MG/DL
CO2 SERPL-SCNC: 25 MMOL/L
CREAT SERPL-MCNC: 0.82 MG/DL
EGFR: 80 ML/MIN/1.73M2
ESTIMATED AVERAGE GLUCOSE: 114 MG/DL
GLUCOSE SERPL-MCNC: 98 MG/DL
HBA1C MFR BLD HPLC: 5.6 %
HDLC SERPL-MCNC: 67 MG/DL
LDLC SERPL CALC-MCNC: 102 MG/DL
NONHDLC SERPL-MCNC: 119 MG/DL
POTASSIUM SERPL-SCNC: 4.1 MMOL/L
PROT SERPL-MCNC: 6.7 G/DL
SODIUM SERPL-SCNC: 140 MMOL/L
TRIGL SERPL-MCNC: 93 MG/DL

## 2024-08-02 PROCEDURE — 93000 ELECTROCARDIOGRAM COMPLETE: CPT

## 2024-08-02 PROCEDURE — 99214 OFFICE O/P EST MOD 30 MIN: CPT | Mod: 25

## 2024-08-02 NOTE — DISCUSSION/SUMMARY
[Hyperlipidemia] : hyperlipidemia [Improving] : improving [None] : There are no changes in medication management [___ Month(s)] : in [unfilled] month(s) [FreeTextEntry1] : work up shows normal heart structurally.  GI sx have resolved. event recorder with brief self terminating SVT events which correlated wit pts sx most likely triiggered by GI sx. sx have now resolved.   would ask pt to cont to monitor for recurrence. get blood work from PCP. f/u 3 months  7/24/2023  blood work shows progressive improvement.   no med intolerance .. BP well controlled..maintain statin.  8/2/2024  lipids cont to improve.  palps are better.  pt reports that her iphone has docmented low ox sats at night. will do sleep studyl [EKG obtained to assist in diagnosis and management of assessed problem(s)] : EKG obtained to assist in diagnosis and management of assessed problem(s)

## 2024-08-02 NOTE — HISTORY OF PRESENT ILLNESS
[FreeTextEntry1] : Patient is a 61 year old without past medical hx who self refers for palpitations.   She had a bad GI virus last week ran fever briefly with severe diarrhea.  Lasted for 5 days.  Went to urgent care on the 5th day for palpitations and from there was referred to the ED for hydration.  Her potassium was 2.9 on 2/12 and then 3.5 on 2/13. blood work was completely normal except for the low potassium.   ECG and vitals reported normal range heart rates. today felt nausea and felt her heart to be racing and came for evaluation.  she has been having diarrhea until today. hasn't taken anything to try to stop diarrhea.  lost about 6 lbs.  denies chest pain or shortness of breath, had pulling feeling under the left armpit.  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  all GI sx are resolved.   echo; structurally normal heart with mild mr/tr event recorder: results not back yet   7/11/2022  overall feels better.  structurally normal heart.  brief runs of SVT all self terminate and appear to be fewer in event over the last month.    10/14/2022  discussed significant increase in cholesterol.  pt states she eats fried chicken twice per week.  has never seen nutritonist.  will make referral  1/20/2023  reviewed lipid panel.    3/24/2023  no new sx.  tolerating statin well with decrease in cholesterol levels.  7/24/2023  no new sx.. palps are better.  reviewed blood work with good reduction in cholesterol.  no other sx,  1/26/2024  palps are better.. seeem to be less sustained.,  no  cp, dizziness., syncope,  no  blood work  8/2/2024  palps are better.  labs reviewed with progressive improvement in lipid panel and hgba1c now in normal...  [employer] phone picked up low sats at night.  pt  doesnt know if she snores..

## 2024-09-18 ENCOUNTER — NON-APPOINTMENT (OUTPATIENT)
Age: 64
End: 2024-09-18

## 2024-11-25 DIAGNOSIS — R73.09 OTHER ABNORMAL GLUCOSE: ICD-10-CM

## 2024-11-26 LAB
ALBUMIN SERPL ELPH-MCNC: 4.9 G/DL
ALP BLD-CCNC: 74 U/L
ALT SERPL-CCNC: 21 U/L
ANION GAP SERPL CALC-SCNC: 13 MMOL/L
AST SERPL-CCNC: 26 U/L
BILIRUB SERPL-MCNC: 0.4 MG/DL
BUN SERPL-MCNC: 19 MG/DL
CALCIUM SERPL-MCNC: 10 MG/DL
CHLORIDE SERPL-SCNC: 103 MMOL/L
CHOLEST SERPL-MCNC: 189 MG/DL
CO2 SERPL-SCNC: 27 MMOL/L
CREAT SERPL-MCNC: 0.71 MG/DL
EGFR: 95 ML/MIN/1.73M2
GLUCOSE SERPL-MCNC: 95 MG/DL
HDLC SERPL-MCNC: 78 MG/DL
LDLC SERPL CALC-MCNC: 96 MG/DL
NONHDLC SERPL-MCNC: 112 MG/DL
POTASSIUM SERPL-SCNC: 4.2 MMOL/L
PROT SERPL-MCNC: 7 G/DL
SODIUM SERPL-SCNC: 144 MMOL/L
TRIGL SERPL-MCNC: 88 MG/DL

## 2024-11-27 LAB
ESTIMATED AVERAGE GLUCOSE: 114 MG/DL
HBA1C MFR BLD HPLC: 5.6 %

## 2024-12-06 ENCOUNTER — NON-APPOINTMENT (OUTPATIENT)
Age: 64
End: 2024-12-06

## 2024-12-06 ENCOUNTER — APPOINTMENT (OUTPATIENT)
Dept: CARDIOLOGY | Facility: CLINIC | Age: 64
End: 2024-12-06
Payer: COMMERCIAL

## 2024-12-06 VITALS — OXYGEN SATURATION: 97 % | SYSTOLIC BLOOD PRESSURE: 123 MMHG | DIASTOLIC BLOOD PRESSURE: 70 MMHG | HEART RATE: 72 BPM

## 2024-12-06 DIAGNOSIS — E78.00 PURE HYPERCHOLESTEROLEMIA, UNSPECIFIED: ICD-10-CM

## 2024-12-06 DIAGNOSIS — I47.10 SUPRAVENTRICULAR TACHYCARDIA, UNSPECIFIED: ICD-10-CM

## 2024-12-06 PROCEDURE — 99213 OFFICE O/P EST LOW 20 MIN: CPT | Mod: 25

## 2024-12-06 PROCEDURE — 93000 ELECTROCARDIOGRAM COMPLETE: CPT

## 2024-12-24 PROBLEM — F10.90 ALCOHOL USE: Status: ACTIVE | Noted: 2022-02-14

## 2025-03-26 ENCOUNTER — RX RENEWAL (OUTPATIENT)
Age: 65
End: 2025-03-26

## 2025-06-03 LAB
ESTIMATED AVERAGE GLUCOSE: 120 MG/DL
HBA1C MFR BLD HPLC: 5.8 %

## 2025-06-04 ENCOUNTER — NON-APPOINTMENT (OUTPATIENT)
Age: 65
End: 2025-06-04

## 2025-06-06 ENCOUNTER — NON-APPOINTMENT (OUTPATIENT)
Age: 65
End: 2025-06-06

## 2025-06-06 ENCOUNTER — APPOINTMENT (OUTPATIENT)
Dept: CARDIOLOGY | Facility: CLINIC | Age: 65
End: 2025-06-06
Payer: COMMERCIAL

## 2025-06-06 VITALS
WEIGHT: 118 LBS | OXYGEN SATURATION: 97 % | HEART RATE: 74 BPM | SYSTOLIC BLOOD PRESSURE: 118 MMHG | DIASTOLIC BLOOD PRESSURE: 75 MMHG | BODY MASS INDEX: 20.9 KG/M2

## 2025-06-06 PROCEDURE — 93000 ELECTROCARDIOGRAM COMPLETE: CPT

## 2025-06-06 PROCEDURE — 99213 OFFICE O/P EST LOW 20 MIN: CPT | Mod: 25

## 2025-06-11 LAB
CHOLEST SERPL-MCNC: 170 MG/DL
HDLC SERPL-MCNC: 72 MG/DL
LDLC SERPL-MCNC: 82 MG/DL
NONHDLC SERPL-MCNC: 98 MG/DL
TRIGL SERPL-MCNC: 86 MG/DL